# Patient Record
Sex: FEMALE | Race: WHITE | NOT HISPANIC OR LATINO | URBAN - METROPOLITAN AREA
[De-identification: names, ages, dates, MRNs, and addresses within clinical notes are randomized per-mention and may not be internally consistent; named-entity substitution may affect disease eponyms.]

---

## 2017-07-10 ENCOUNTER — CONVERSION ENCOUNTER (OUTPATIENT)
Dept: GENERAL RADIOLOGY | Facility: HOSPITAL | Age: 52
End: 2017-07-10

## 2018-01-10 ENCOUNTER — OFFICE VISIT CONVERTED (OUTPATIENT)
Dept: NEUROSURGERY | Facility: CLINIC | Age: 53
End: 2018-01-10
Attending: PHYSICIAN ASSISTANT

## 2018-02-08 ENCOUNTER — OFFICE VISIT CONVERTED (OUTPATIENT)
Dept: GASTROENTEROLOGY | Facility: CLINIC | Age: 53
End: 2018-02-08
Attending: NURSE PRACTITIONER

## 2018-03-19 ENCOUNTER — OFFICE VISIT CONVERTED (OUTPATIENT)
Dept: ORTHOPEDIC SURGERY | Facility: CLINIC | Age: 53
End: 2018-03-19
Attending: PHYSICIAN ASSISTANT

## 2018-04-03 ENCOUNTER — OFFICE VISIT CONVERTED (OUTPATIENT)
Dept: ORTHOPEDIC SURGERY | Facility: CLINIC | Age: 53
End: 2018-04-03
Attending: ORTHOPAEDIC SURGERY

## 2018-05-08 ENCOUNTER — OFFICE VISIT CONVERTED (OUTPATIENT)
Dept: GASTROENTEROLOGY | Facility: CLINIC | Age: 53
End: 2018-05-08
Attending: NURSE PRACTITIONER

## 2018-07-09 ENCOUNTER — OFFICE VISIT CONVERTED (OUTPATIENT)
Dept: GASTROENTEROLOGY | Facility: CLINIC | Age: 53
End: 2018-07-09
Attending: NURSE PRACTITIONER

## 2018-08-15 ENCOUNTER — OFFICE VISIT CONVERTED (OUTPATIENT)
Dept: GASTROENTEROLOGY | Facility: CLINIC | Age: 53
End: 2018-08-15
Attending: NURSE PRACTITIONER

## 2018-08-15 ENCOUNTER — CONVERSION ENCOUNTER (OUTPATIENT)
Dept: OTHER | Facility: HOSPITAL | Age: 53
End: 2018-08-15

## 2018-09-28 ENCOUNTER — OFFICE VISIT CONVERTED (OUTPATIENT)
Dept: ORTHOPEDIC SURGERY | Facility: CLINIC | Age: 53
End: 2018-09-28
Attending: PHYSICIAN ASSISTANT

## 2018-10-16 ENCOUNTER — OFFICE VISIT CONVERTED (OUTPATIENT)
Dept: ORTHOPEDIC SURGERY | Facility: CLINIC | Age: 53
End: 2018-10-16
Attending: PHYSICIAN ASSISTANT

## 2018-11-15 ENCOUNTER — OFFICE VISIT CONVERTED (OUTPATIENT)
Dept: GASTROENTEROLOGY | Facility: CLINIC | Age: 53
End: 2018-11-15
Attending: NURSE PRACTITIONER

## 2018-11-29 ENCOUNTER — CONVERSION ENCOUNTER (OUTPATIENT)
Dept: ORTHOPEDIC SURGERY | Facility: CLINIC | Age: 53
End: 2018-11-29

## 2018-11-29 ENCOUNTER — OFFICE VISIT CONVERTED (OUTPATIENT)
Dept: ORTHOPEDIC SURGERY | Facility: CLINIC | Age: 53
End: 2018-11-29
Attending: PHYSICIAN ASSISTANT

## 2019-01-02 ENCOUNTER — HOSPITAL ENCOUNTER (OUTPATIENT)
Dept: OTHER | Facility: HOSPITAL | Age: 54
Discharge: HOME | End: 2019-01-02
Attending: NURSE PRACTITIONER

## 2019-01-02 LAB
BASOPHILS # BLD AUTO: 0.08 10*3/UL (ref 0–0.2)
BASOPHILS NFR BLD AUTO: 1.1 % (ref 0–3)
EOSINOPHIL # BLD AUTO: 0.2 10*3/UL (ref 0–0.7)
EOSINOPHIL # BLD AUTO: 2.9 % (ref 0–7)
ERYTHROCYTE [DISTWIDTH] IN BLOOD BY AUTOMATED COUNT: 14.6 % (ref 11.5–14.5)
HBA1C MFR BLD: 13.7 G/DL (ref 12–16)
HCT VFR BLD AUTO: 41.7 % (ref 37–47)
LYMPHOCYTES # BLD AUTO: 2.09 10*3/UL (ref 1–5)
MCH RBC QN AUTO: 26.8 PG (ref 27–31)
MCHC RBC AUTO-ENTMCNC: 32.9 G/DL (ref 33–37)
MCV RBC AUTO: 81.5 FL (ref 81–99)
MONOCYTES # BLD AUTO: 0.46 10*3/UL (ref 0.2–1.2)
MONOCYTES NFR BLD AUTO: 6.66 % (ref 3–10)
NEUTROPHILS # BLD AUTO: 4.1 10*3/UL (ref 2–8)
NEUTROPHILS NFR BLD AUTO: 59.2 % (ref 30–85)
NRBC BLD AUTO-RTO: 0 % (ref 0–0.01)
PLATELET # BLD AUTO: 347 10*3/UL (ref 130–400)
PMV BLD AUTO: 8.4 FL (ref 7.4–10.4)
RBC # BLD AUTO: 5.12 10*6/UL (ref 4.2–5.4)
T4 FREE SERPL-MCNC: 1.3 NG/DL (ref 0.9–1.8)
TSH SERPL-ACNC: 1.05 M[IU]/L (ref 0.27–4.2)
VARIANT LYMPHS NFR BLD MANUAL: 30.1 % (ref 20–45)
WBC # BLD AUTO: 6.93 10*3/UL (ref 4.8–10.8)

## 2019-01-03 LAB — T3FREE SERPL-MCNC: 4.9 PG/ML (ref 2–4.4)

## 2019-01-22 ENCOUNTER — HOSPITAL ENCOUNTER (OUTPATIENT)
Dept: DIABETES SERVICES | Facility: HOSPITAL | Age: 54
Setting detail: RECURRING SERIES
Discharge: HOME | End: 2019-01-31
Attending: NURSE PRACTITIONER

## 2019-02-01 ENCOUNTER — HOSPITAL ENCOUNTER (OUTPATIENT)
Dept: PHYSICAL THERAPY | Facility: CLINIC | Age: 54
Setting detail: RECURRING SERIES
Discharge: HOME | End: 2019-02-13
Attending: ORTHOPAEDIC SURGERY

## 2019-03-04 ENCOUNTER — HOSPITAL ENCOUNTER (OUTPATIENT)
Dept: OTHER | Facility: HOSPITAL | Age: 54
Discharge: HOME | End: 2019-03-04

## 2019-03-04 LAB
ALBUMIN SERPL-MCNC: 4.5 G/DL (ref 3.5–5)
ALBUMIN/GLOB SERPL: 1.5 {RATIO} (ref 1.4–2.6)
ALP SERPL-CCNC: 81 U/L (ref 53–141)
ALT SERPL-CCNC: 9 U/L (ref 10–40)
ANION GAP SERPL CALC-SCNC: 17 MMOL/L (ref 8–19)
AST SERPL-CCNC: 20 U/L (ref 15–50)
BILIRUB SERPL-MCNC: 0.45 MG/DL (ref 0.2–1.3)
BUN SERPL-MCNC: 17 MG/DL (ref 5–25)
BUN/CREAT SERPL: 24 {RATIO} (ref 6–20)
CALCIUM SERPL-MCNC: 10.6 MG/DL (ref 8.7–10.4)
CHLORIDE SERPL-SCNC: 100 MMOL/L (ref 99–111)
CONV CO2: 29 MMOL/L (ref 22–32)
CONV TOTAL PROTEIN: 7.5 G/DL (ref 6.3–8.2)
CREAT UR-MCNC: 0.7 MG/DL (ref 0.5–0.9)
GFR SERPLBLD BASED ON 1.73 SQ M-ARVRAT: >60 ML/MIN/{1.73_M2}
GLOBULIN UR ELPH-MCNC: 3 G/DL (ref 2–3.5)
GLUCOSE SERPL-MCNC: 90 MG/DL (ref 65–99)
MAGNESIUM SERPL-MCNC: 1.46 MG/DL (ref 1.6–2.3)
OSMOLALITY SERPL CALC.SUM OF ELEC: 297 MOSM/KG (ref 273–304)
POTASSIUM SERPL-SCNC: 3.4 MMOL/L (ref 3.5–5.3)
SODIUM SERPL-SCNC: 143 MMOL/L (ref 135–147)

## 2019-03-12 ENCOUNTER — HOSPITAL ENCOUNTER (OUTPATIENT)
Dept: OTHER | Facility: HOSPITAL | Age: 54
Discharge: HOME | End: 2019-03-12

## 2019-03-12 LAB
APPEARANCE UR: CLEAR
BILIRUB UR QL: NEGATIVE
COLOR UR: YELLOW
CONV BACTERIA: NEGATIVE
CONV COLLECTION SOURCE (UA): ABNORMAL
CONV CREATININE URINE, RANDOM: 150.6 MG/DL (ref 10–300)
CONV MICROALBUM.,U,RANDOM: 13.1 MG/L (ref 0–20)
CONV PROTEIN TO CREATININE RATIO (RANDOM URINE): 0.13 {RATIO} (ref 0–0.1)
CONV UROBILINOGEN IN URINE BY AUTOMATED TEST STRIP: 0.2 {EHRLICHU}/DL (ref 0.1–1)
GLUCOSE UR QL: NEGATIVE MG/DL
HGB UR QL STRIP: NEGATIVE
KETONES UR QL STRIP: NEGATIVE MG/DL
LEUKOCYTE ESTERASE UR QL STRIP: ABNORMAL
MICROALBUMIN/CREAT UR: 8.7 MG/G{CRE} (ref 0–35)
NITRITE UR QL STRIP: NEGATIVE
PH UR STRIP.AUTO: 7.5 [PH] (ref 5–8)
PROT UR QL: NEGATIVE MG/DL
PROT UR-MCNC: 18.9 MG/DL
RBC #/AREA URNS HPF: ABNORMAL /[HPF]
SP GR UR: 1.02 (ref 1–1.03)
WBC #/AREA URNS HPF: ABNORMAL /[HPF]

## 2019-04-22 ENCOUNTER — HOSPITAL ENCOUNTER (OUTPATIENT)
Dept: DIABETES SERVICES | Facility: HOSPITAL | Age: 54
Setting detail: RECURRING SERIES
Discharge: HOME | End: 2019-04-30
Attending: NURSE PRACTITIONER

## 2019-05-09 ENCOUNTER — HOSPITAL ENCOUNTER (OUTPATIENT)
Dept: OTHER | Facility: HOSPITAL | Age: 54
Discharge: HOME | End: 2019-05-09
Attending: NURSE PRACTITIONER

## 2019-05-09 LAB
ALBUMIN SERPL-MCNC: 4.2 G/DL (ref 3.5–5)
ALBUMIN/GLOB SERPL: 1.4 {RATIO} (ref 1.4–2.6)
ALP SERPL-CCNC: 124 U/L (ref 53–141)
ALT SERPL-CCNC: 7 U/L (ref 10–40)
ANION GAP SERPL CALC-SCNC: 15 MMOL/L (ref 8–19)
AST SERPL-CCNC: 20 U/L (ref 15–50)
BILIRUB SERPL-MCNC: 0.26 MG/DL (ref 0.2–1.3)
BUN SERPL-MCNC: 20 MG/DL (ref 5–25)
BUN/CREAT SERPL: 31 {RATIO} (ref 6–20)
CALCIUM SERPL-MCNC: 9.6 MG/DL (ref 8.7–10.4)
CHLORIDE SERPL-SCNC: 98 MMOL/L (ref 99–111)
CHOLEST SERPL-MCNC: 204 MG/DL (ref 107–200)
CHOLEST/HDLC SERPL: 3.1 {RATIO} (ref 3–6)
CONV CO2: 31 MMOL/L (ref 22–32)
CONV TOTAL PROTEIN: 7.3 G/DL (ref 6.3–8.2)
CREAT UR-MCNC: 0.64 MG/DL (ref 0.5–0.9)
GFR SERPLBLD BASED ON 1.73 SQ M-ARVRAT: >60 ML/MIN/{1.73_M2}
GLOBULIN UR ELPH-MCNC: 3.1 G/DL (ref 2–3.5)
GLUCOSE SERPL-MCNC: 88 MG/DL (ref 65–99)
HDLC SERPL-MCNC: 66 MG/DL (ref 40–60)
LDLC SERPL CALC-MCNC: 104 MG/DL (ref 70–100)
OSMOLALITY SERPL CALC.SUM OF ELEC: 292 MOSM/KG (ref 273–304)
POTASSIUM SERPL-SCNC: 3.6 MMOL/L (ref 3.5–5.3)
SODIUM SERPL-SCNC: 140 MMOL/L (ref 135–147)
TRIGL SERPL-MCNC: 169 MG/DL (ref 40–150)
VLDLC SERPL-MCNC: 34 MG/DL (ref 5–37)

## 2019-05-15 ENCOUNTER — HOSPITAL ENCOUNTER (OUTPATIENT)
Dept: GENERAL RADIOLOGY | Facility: HOSPITAL | Age: 54
Discharge: HOME | End: 2019-05-15
Attending: NURSE PRACTITIONER

## 2019-05-15 ENCOUNTER — OFFICE VISIT CONVERTED (OUTPATIENT)
Dept: GASTROENTEROLOGY | Facility: CLINIC | Age: 54
End: 2019-05-15
Attending: NURSE PRACTITIONER

## 2019-06-11 ENCOUNTER — HOSPITAL ENCOUNTER (OUTPATIENT)
Dept: OTHER | Facility: HOSPITAL | Age: 54
Discharge: HOME | End: 2019-06-11

## 2019-06-11 LAB
ALBUMIN SERPL-MCNC: 4.1 G/DL (ref 3.5–5)
ALBUMIN/GLOB SERPL: 1.6 {RATIO} (ref 1.4–2.6)
ALP SERPL-CCNC: 100 U/L (ref 53–141)
ALT SERPL-CCNC: 9 U/L (ref 10–40)
ANION GAP SERPL CALC-SCNC: 17 MMOL/L (ref 8–19)
AST SERPL-CCNC: 21 U/L (ref 15–50)
BASOPHILS # BLD AUTO: 0.07 10*3/UL (ref 0–0.2)
BASOPHILS NFR BLD AUTO: 1.2 % (ref 0–3)
BILIRUB SERPL-MCNC: 0.4 MG/DL (ref 0.2–1.3)
BUN SERPL-MCNC: 16 MG/DL (ref 5–25)
BUN/CREAT SERPL: 25 {RATIO} (ref 6–20)
CALCIUM SERPL-MCNC: 9.4 MG/DL (ref 8.7–10.4)
CHLORIDE SERPL-SCNC: 102 MMOL/L (ref 99–111)
CHOLEST SERPL-MCNC: 225 MG/DL (ref 107–200)
CHOLEST/HDLC SERPL: 2.7 {RATIO} (ref 3–6)
CONV ABS IMM GRAN: 0.01 10*3/UL (ref 0–0.2)
CONV CO2: 29 MMOL/L (ref 22–32)
CONV IMMATURE GRAN: 0.2 % (ref 0–1.8)
CONV TOTAL PROTEIN: 6.7 G/DL (ref 6.3–8.2)
CREAT UR-MCNC: 0.63 MG/DL (ref 0.5–0.9)
DEPRECATED RDW RBC AUTO: 38.3 FL (ref 36.4–46.3)
EOSINOPHIL # BLD AUTO: 0.25 10*3/UL (ref 0–0.7)
EOSINOPHIL # BLD AUTO: 4.3 % (ref 0–7)
ERYTHROCYTE [DISTWIDTH] IN BLOOD BY AUTOMATED COUNT: 12.7 % (ref 11.7–14.4)
EST. AVERAGE GLUCOSE BLD GHB EST-MCNC: 103 MG/DL
GFR SERPLBLD BASED ON 1.73 SQ M-ARVRAT: >60 ML/MIN/{1.73_M2}
GLOBULIN UR ELPH-MCNC: 2.6 G/DL (ref 2–3.5)
GLUCOSE SERPL-MCNC: 87 MG/DL (ref 65–99)
HBA1C MFR BLD: 12.6 G/DL (ref 12–16)
HBA1C MFR BLD: 5.2 % (ref 3.5–5.7)
HCT VFR BLD AUTO: 38.5 % (ref 37–47)
HDLC SERPL-MCNC: 84 MG/DL (ref 40–60)
LDLC SERPL CALC-MCNC: 128 MG/DL (ref 70–100)
LYMPHOCYTES # BLD AUTO: 2.03 10*3/UL (ref 1–5)
MAGNESIUM SERPL-MCNC: 1.48 MG/DL (ref 1.6–2.3)
MCH RBC QN AUTO: 27.7 PG (ref 27–31)
MCHC RBC AUTO-ENTMCNC: 32.7 G/DL (ref 33–37)
MCV RBC AUTO: 84.6 FL (ref 81–99)
MONOCYTES # BLD AUTO: 0.54 10*3/UL (ref 0.2–1.2)
MONOCYTES NFR BLD AUTO: 9.2 % (ref 3–10)
NEUTROPHILS # BLD AUTO: 2.94 10*3/UL (ref 2–8)
NEUTROPHILS NFR BLD AUTO: 50.3 % (ref 30–85)
NRBC CBCN: 0 % (ref 0–0.7)
OSMOLALITY SERPL CALC.SUM OF ELEC: 301 MOSM/KG (ref 273–304)
PLATELET # BLD AUTO: 303 10*3/UL (ref 130–400)
PMV BLD AUTO: 11.3 FL (ref 9.4–12.3)
POTASSIUM SERPL-SCNC: 3.4 MMOL/L (ref 3.5–5.3)
RBC # BLD AUTO: 4.55 10*6/UL (ref 4.2–5.4)
SODIUM SERPL-SCNC: 145 MMOL/L (ref 135–147)
TRIGL SERPL-MCNC: 63 MG/DL (ref 40–150)
VARIANT LYMPHS NFR BLD MANUAL: 34.8 % (ref 20–45)
VLDLC SERPL-MCNC: 13 MG/DL (ref 5–37)
WBC # BLD AUTO: 5.84 10*3/UL (ref 4.8–10.8)

## 2019-07-01 ENCOUNTER — HOSPITAL ENCOUNTER (OUTPATIENT)
Dept: GENERAL RADIOLOGY | Facility: HOSPITAL | Age: 54
Discharge: HOME | End: 2019-07-01
Attending: NURSE PRACTITIONER

## 2019-07-03 ENCOUNTER — HOSPITAL ENCOUNTER (OUTPATIENT)
Dept: NUTRITION | Facility: HOSPITAL | Age: 54
Setting detail: RECURRING SERIES
Discharge: HOME | End: 2019-07-31
Attending: NURSE PRACTITIONER

## 2019-08-13 ENCOUNTER — HOSPITAL ENCOUNTER (OUTPATIENT)
Dept: GENERAL RADIOLOGY | Facility: HOSPITAL | Age: 54
Discharge: HOME | End: 2019-08-13
Attending: NURSE PRACTITIONER

## 2019-08-27 ENCOUNTER — HOSPITAL ENCOUNTER (OUTPATIENT)
Dept: OTHER | Facility: HOSPITAL | Age: 54
Discharge: HOME | End: 2019-08-27
Attending: INTERNAL MEDICINE

## 2019-08-27 LAB
ALBUMIN SERPL-MCNC: 4.4 G/DL (ref 3.5–5)
ALBUMIN/GLOB SERPL: 1.7 {RATIO} (ref 1.4–2.6)
ALP SERPL-CCNC: 96 U/L (ref 53–141)
ALT SERPL-CCNC: 10 U/L (ref 10–40)
ANION GAP SERPL CALC-SCNC: 15 MMOL/L (ref 8–19)
AST SERPL-CCNC: 19 U/L (ref 15–50)
BILIRUB SERPL-MCNC: 0.37 MG/DL (ref 0.2–1.3)
BUN SERPL-MCNC: 13 MG/DL (ref 5–25)
BUN/CREAT SERPL: 18 {RATIO} (ref 6–20)
CALCIUM SERPL-MCNC: 9.4 MG/DL (ref 8.7–10.4)
CHLORIDE SERPL-SCNC: 99 MMOL/L (ref 99–111)
CONV CO2: 29 MMOL/L (ref 22–32)
CONV TOTAL PROTEIN: 7 G/DL (ref 6.3–8.2)
CREAT UR-MCNC: 0.72 MG/DL (ref 0.5–0.9)
GFR SERPLBLD BASED ON 1.73 SQ M-ARVRAT: >60 ML/MIN/{1.73_M2}
GLOBULIN UR ELPH-MCNC: 2.6 G/DL (ref 2–3.5)
GLUCOSE SERPL-MCNC: 87 MG/DL (ref 65–99)
MAGNESIUM SERPL-MCNC: 1.49 MG/DL (ref 1.6–2.3)
OSMOLALITY SERPL CALC.SUM OF ELEC: 287 MOSM/KG (ref 273–304)
POTASSIUM SERPL-SCNC: 3.8 MMOL/L (ref 3.5–5.3)
SODIUM SERPL-SCNC: 139 MMOL/L (ref 135–147)

## 2019-09-11 ENCOUNTER — HOSPITAL ENCOUNTER (OUTPATIENT)
Dept: NUTRITION | Facility: HOSPITAL | Age: 54
Setting detail: RECURRING SERIES
Discharge: HOME | End: 2019-09-30
Attending: NURSE PRACTITIONER

## 2019-11-06 ENCOUNTER — OFFICE VISIT CONVERTED (OUTPATIENT)
Dept: GASTROENTEROLOGY | Facility: CLINIC | Age: 54
End: 2019-11-06
Attending: NURSE PRACTITIONER

## 2019-11-06 ENCOUNTER — HOSPITAL ENCOUNTER (OUTPATIENT)
Dept: LAB | Facility: HOSPITAL | Age: 54
Discharge: HOME | End: 2019-11-06
Attending: INTERNAL MEDICINE

## 2019-11-06 LAB
ALBUMIN SERPL-MCNC: 4.8 G/DL (ref 3.5–5)
ALBUMIN/GLOB SERPL: 2.1 {RATIO} (ref 1.4–2.6)
ALP SERPL-CCNC: 82 U/L (ref 53–141)
ALT SERPL-CCNC: 6 U/L (ref 10–40)
ANION GAP SERPL CALC-SCNC: 20 MMOL/L (ref 8–19)
AST SERPL-CCNC: 19 U/L (ref 15–50)
BILIRUB SERPL-MCNC: 0.53 MG/DL (ref 0.2–1.3)
BUN SERPL-MCNC: 11 MG/DL (ref 5–25)
BUN/CREAT SERPL: 14 {RATIO} (ref 6–20)
CALCIUM SERPL-MCNC: 10.2 MG/DL (ref 8.7–10.4)
CHLORIDE SERPL-SCNC: 99 MMOL/L (ref 99–111)
CONV CO2: 27 MMOL/L (ref 22–32)
CONV TOTAL PROTEIN: 7.1 G/DL (ref 6.3–8.2)
CREAT UR-MCNC: 0.81 MG/DL (ref 0.5–0.9)
GFR SERPLBLD BASED ON 1.73 SQ M-ARVRAT: >60 ML/MIN/{1.73_M2}
GLOBULIN UR ELPH-MCNC: 2.3 G/DL (ref 2–3.5)
GLUCOSE SERPL-MCNC: 102 MG/DL (ref 65–99)
MAGNESIUM SERPL-MCNC: 1.32 MG/DL (ref 1.6–2.3)
OSMOLALITY SERPL CALC.SUM OF ELEC: 296 MOSM/KG (ref 273–304)
POTASSIUM SERPL-SCNC: 3.3 MMOL/L (ref 3.5–5.3)
SODIUM SERPL-SCNC: 143 MMOL/L (ref 135–147)

## 2019-12-09 ENCOUNTER — HOSPITAL ENCOUNTER (OUTPATIENT)
Dept: OTHER | Facility: HOSPITAL | Age: 54
Discharge: HOME | End: 2019-12-09
Attending: INTERNAL MEDICINE

## 2019-12-09 LAB
ALBUMIN SERPL-MCNC: 4.8 G/DL (ref 3.5–5)
ALBUMIN/GLOB SERPL: 1.9 {RATIO} (ref 1.4–2.6)
ALP SERPL-CCNC: 80 U/L (ref 53–141)
ALT SERPL-CCNC: 9 U/L (ref 10–40)
ANION GAP SERPL CALC-SCNC: 21 MMOL/L (ref 8–19)
APPEARANCE UR: CLEAR
AST SERPL-CCNC: 22 U/L (ref 15–50)
BILIRUB SERPL-MCNC: 0.79 MG/DL (ref 0.2–1.3)
BILIRUB UR QL: NEGATIVE
BUN SERPL-MCNC: 21 MG/DL (ref 5–25)
BUN/CREAT SERPL: 30 {RATIO} (ref 6–20)
CALCIUM SERPL-MCNC: 10.3 MG/DL (ref 8.7–10.4)
CHLORIDE SERPL-SCNC: 99 MMOL/L (ref 99–111)
COLOR UR: YELLOW
CONV BACTERIA: NEGATIVE
CONV CO2: 24 MMOL/L (ref 22–32)
CONV COLLECTION SOURCE (UA): ABNORMAL
CONV CREATININE URINE, RANDOM: 178.1 MG/DL (ref 10–300)
CONV HYALINE CASTS IN URINE MICRO: ABNORMAL /[LPF]
CONV MICROALBUM.,U,RANDOM: 23.1 MG/L (ref 0–20)
CONV PROTEIN TO CREATININE RATIO (RANDOM URINE): 0.09 {RATIO} (ref 0–0.1)
CONV TOTAL PROTEIN: 7.3 G/DL (ref 6.3–8.2)
CONV UROBILINOGEN IN URINE BY AUTOMATED TEST STRIP: 0.2 {EHRLICHU}/DL (ref 0.1–1)
CREAT UR-MCNC: 0.7 MG/DL (ref 0.5–0.9)
GFR SERPLBLD BASED ON 1.73 SQ M-ARVRAT: >60 ML/MIN/{1.73_M2}
GLOBULIN UR ELPH-MCNC: 2.5 G/DL (ref 2–3.5)
GLUCOSE SERPL-MCNC: 92 MG/DL (ref 65–99)
GLUCOSE UR QL: NEGATIVE MG/DL
HGB UR QL STRIP: NEGATIVE
KETONES UR QL STRIP: 40 MG/DL
LEUKOCYTE ESTERASE UR QL STRIP: ABNORMAL
MICROALBUMIN/CREAT UR: 13 MG/G{CRE} (ref 0–35)
NITRITE UR QL STRIP: NEGATIVE
OSMOLALITY SERPL CALC.SUM OF ELEC: 293 MOSM/KG (ref 273–304)
PH UR STRIP.AUTO: 5.5 [PH] (ref 5–8)
POTASSIUM SERPL-SCNC: 3.5 MMOL/L (ref 3.5–5.3)
PROT UR QL: NEGATIVE MG/DL
PROT UR-MCNC: 16.3 MG/DL
RBC #/AREA URNS HPF: ABNORMAL /[HPF]
SODIUM SERPL-SCNC: 140 MMOL/L (ref 135–147)
SP GR UR: 1.02 (ref 1–1.03)
SQUAMOUS SPT QL MICRO: ABNORMAL /[HPF]
WBC #/AREA URNS HPF: ABNORMAL /[HPF]

## 2019-12-17 ENCOUNTER — HOSPITAL ENCOUNTER (OUTPATIENT)
Dept: GASTROENTEROLOGY | Facility: HOSPITAL | Age: 54
Setting detail: HOSPITAL OUTPATIENT SURGERY
Discharge: HOME | End: 2019-12-17
Attending: INTERNAL MEDICINE

## 2020-04-30 ENCOUNTER — TELEPHONE CONVERTED (OUTPATIENT)
Dept: GASTROENTEROLOGY | Facility: CLINIC | Age: 55
End: 2020-04-30
Attending: NURSE PRACTITIONER

## 2020-05-12 ENCOUNTER — HOSPITAL ENCOUNTER (OUTPATIENT)
Dept: OTHER | Facility: HOSPITAL | Age: 55
Discharge: HOME | End: 2020-05-12
Attending: INTERNAL MEDICINE

## 2020-05-12 LAB
ALBUMIN SERPL-MCNC: 4.3 G/DL (ref 3.5–5)
ALBUMIN/GLOB SERPL: 1.6 {RATIO} (ref 1.4–2.6)
ALP SERPL-CCNC: 83 U/L (ref 53–141)
ALT SERPL-CCNC: 7 U/L (ref 10–40)
ANION GAP SERPL CALC-SCNC: 15 MMOL/L (ref 8–19)
AST SERPL-CCNC: 23 U/L (ref 15–50)
BILIRUB SERPL-MCNC: 0.58 MG/DL (ref 0.2–1.3)
BUN SERPL-MCNC: 15 MG/DL (ref 5–25)
BUN/CREAT SERPL: 22 {RATIO} (ref 6–20)
CALCIUM SERPL-MCNC: 9.8 MG/DL (ref 8.7–10.4)
CHLORIDE SERPL-SCNC: 100 MMOL/L (ref 99–111)
CONV CO2: 29 MMOL/L (ref 22–32)
CONV TOTAL PROTEIN: 7 G/DL (ref 6.3–8.2)
CREAT UR-MCNC: 0.69 MG/DL (ref 0.5–0.9)
GFR SERPLBLD BASED ON 1.73 SQ M-ARVRAT: >60 ML/MIN/{1.73_M2}
GLOBULIN UR ELPH-MCNC: 2.7 G/DL (ref 2–3.5)
GLUCOSE SERPL-MCNC: 79 MG/DL (ref 65–99)
MAGNESIUM SERPL-MCNC: 1.6 MG/DL (ref 1.6–2.3)
OSMOLALITY SERPL CALC.SUM OF ELEC: 290 MOSM/KG (ref 273–304)
POTASSIUM SERPL-SCNC: 4.3 MMOL/L (ref 3.5–5.3)
SODIUM SERPL-SCNC: 140 MMOL/L (ref 135–147)

## 2020-06-01 ENCOUNTER — OFFICE VISIT CONVERTED (OUTPATIENT)
Dept: GASTROENTEROLOGY | Facility: CLINIC | Age: 55
End: 2020-06-01
Attending: NURSE PRACTITIONER

## 2020-06-05 ENCOUNTER — HOSPITAL ENCOUNTER (OUTPATIENT)
Dept: NUCLEAR MEDICINE | Facility: HOSPITAL | Age: 55
Discharge: HOME | End: 2020-06-05
Attending: NURSE PRACTITIONER

## 2020-06-05 LAB
ALBUMIN SERPL-MCNC: 4.2 G/DL (ref 3.5–5)
ALBUMIN/GLOB SERPL: 1.8 {RATIO} (ref 1.4–2.6)
ALP SERPL-CCNC: 76 U/L (ref 53–141)
ALT SERPL-CCNC: 7 U/L (ref 10–40)
ANION GAP SERPL CALC-SCNC: 12 MMOL/L (ref 8–19)
AST SERPL-CCNC: 18 U/L (ref 15–50)
BASOPHILS # BLD AUTO: 0.06 10*3/UL (ref 0–0.2)
BASOPHILS NFR BLD AUTO: 1.1 % (ref 0–3)
BILIRUB SERPL-MCNC: 0.4 MG/DL (ref 0.2–1.3)
BUN SERPL-MCNC: 25 MG/DL (ref 5–25)
BUN/CREAT SERPL: 45 {RATIO} (ref 6–20)
CALCIUM SERPL-MCNC: 9.3 MG/DL (ref 8.7–10.4)
CHLORIDE SERPL-SCNC: 101 MMOL/L (ref 99–111)
CHOLEST SERPL-MCNC: 179 MG/DL (ref 107–200)
CHOLEST/HDLC SERPL: 2.5 {RATIO} (ref 3–6)
CONV ABS IMM GRAN: 0.01 10*3/UL (ref 0–0.2)
CONV CO2: 29 MMOL/L (ref 22–32)
CONV IMMATURE GRAN: 0.2 % (ref 0–1.8)
CONV TOTAL PROTEIN: 6.6 G/DL (ref 6.3–8.2)
CREAT UR-MCNC: 0.56 MG/DL (ref 0.5–0.9)
DEPRECATED RDW RBC AUTO: 41.4 FL (ref 36.4–46.3)
EOSINOPHIL # BLD AUTO: 0.21 10*3/UL (ref 0–0.7)
EOSINOPHIL # BLD AUTO: 3.9 % (ref 0–7)
ERYTHROCYTE [DISTWIDTH] IN BLOOD BY AUTOMATED COUNT: 13.3 % (ref 11.7–14.4)
EST. AVERAGE GLUCOSE BLD GHB EST-MCNC: 111 MG/DL
GFR SERPLBLD BASED ON 1.73 SQ M-ARVRAT: >60 ML/MIN/{1.73_M2}
GLOBULIN UR ELPH-MCNC: 2.4 G/DL (ref 2–3.5)
GLUCOSE SERPL-MCNC: 90 MG/DL (ref 65–99)
HBA1C MFR BLD: 5.5 % (ref 3.5–5.7)
HCT VFR BLD AUTO: 39.5 % (ref 37–47)
HDLC SERPL-MCNC: 71 MG/DL (ref 40–60)
HGB BLD-MCNC: 12.4 G/DL (ref 12–16)
LDLC SERPL CALC-MCNC: 91 MG/DL (ref 70–100)
LYMPHOCYTES # BLD AUTO: 1.92 10*3/UL (ref 1–5)
LYMPHOCYTES NFR BLD AUTO: 35.8 % (ref 20–45)
MCH RBC QN AUTO: 26.6 PG (ref 27–31)
MCHC RBC AUTO-ENTMCNC: 31.4 G/DL (ref 33–37)
MCV RBC AUTO: 84.8 FL (ref 81–99)
MONOCYTES # BLD AUTO: 0.46 10*3/UL (ref 0.2–1.2)
MONOCYTES NFR BLD AUTO: 8.6 % (ref 3–10)
NEUTROPHILS # BLD AUTO: 2.7 10*3/UL (ref 2–8)
NEUTROPHILS NFR BLD AUTO: 50.4 % (ref 30–85)
NRBC CBCN: 0 % (ref 0–0.7)
OSMOLALITY SERPL CALC.SUM OF ELEC: 292 MOSM/KG (ref 273–304)
PLATELET # BLD AUTO: 202 10*3/UL (ref 130–400)
PMV BLD AUTO: 12.9 FL (ref 9.4–12.3)
POTASSIUM SERPL-SCNC: 3.4 MMOL/L (ref 3.5–5.3)
RBC # BLD AUTO: 4.66 10*6/UL (ref 4.2–5.4)
SODIUM SERPL-SCNC: 139 MMOL/L (ref 135–147)
TRIGL SERPL-MCNC: 84 MG/DL (ref 40–150)
VLDLC SERPL-MCNC: 17 MG/DL (ref 5–37)
WBC # BLD AUTO: 5.36 10*3/UL (ref 4.8–10.8)

## 2020-06-29 ENCOUNTER — OFFICE VISIT CONVERTED (OUTPATIENT)
Dept: NEUROLOGY | Facility: CLINIC | Age: 55
End: 2020-06-29
Attending: PSYCHIATRY & NEUROLOGY

## 2020-06-29 ENCOUNTER — CONVERSION ENCOUNTER (OUTPATIENT)
Dept: NEUROLOGY | Facility: CLINIC | Age: 55
End: 2020-06-29

## 2020-07-14 ENCOUNTER — OFFICE VISIT CONVERTED (OUTPATIENT)
Dept: GASTROENTEROLOGY | Facility: CLINIC | Age: 55
End: 2020-07-14
Attending: NURSE PRACTITIONER

## 2020-07-16 ENCOUNTER — HOSPITAL ENCOUNTER (OUTPATIENT)
Dept: GENERAL RADIOLOGY | Facility: HOSPITAL | Age: 55
Discharge: HOME | End: 2020-07-16
Attending: NURSE PRACTITIONER

## 2020-08-05 ENCOUNTER — HOSPITAL ENCOUNTER (OUTPATIENT)
Dept: OTHER | Facility: HOSPITAL | Age: 55
Discharge: HOME | End: 2020-08-05
Attending: INTERNAL MEDICINE

## 2020-08-05 LAB
ALBUMIN SERPL-MCNC: 4.3 G/DL (ref 3.5–5)
ALBUMIN/GLOB SERPL: 1.3 {RATIO} (ref 1.4–2.6)
ALP SERPL-CCNC: 80 U/L (ref 53–141)
ALT SERPL-CCNC: <5 U/L (ref 10–40)
ANION GAP SERPL CALC-SCNC: 25 MMOL/L (ref 8–19)
AST SERPL-CCNC: 18 U/L (ref 15–50)
BILIRUB SERPL-MCNC: 0.51 MG/DL (ref 0.2–1.3)
BUN SERPL-MCNC: 12 MG/DL (ref 5–25)
BUN/CREAT SERPL: 14 {RATIO} (ref 6–20)
CALCIUM SERPL-MCNC: 10.6 MG/DL (ref 8.7–10.4)
CHLORIDE SERPL-SCNC: 101 MMOL/L (ref 99–111)
CONV CO2: 23 MMOL/L (ref 22–32)
CONV TOTAL PROTEIN: 7.6 G/DL (ref 6.3–8.2)
CREAT UR-MCNC: 0.86 MG/DL (ref 0.5–0.9)
GFR SERPLBLD BASED ON 1.73 SQ M-ARVRAT: >60 ML/MIN/{1.73_M2}
GLOBULIN UR ELPH-MCNC: 3.3 G/DL (ref 2–3.5)
GLUCOSE SERPL-MCNC: 90 MG/DL (ref 65–99)
MAGNESIUM SERPL-MCNC: 1.7 MG/DL (ref 1.6–2.3)
OSMOLALITY SERPL CALC.SUM OF ELEC: 299 MOSM/KG (ref 273–304)
PHOSPHATE SERPL-MCNC: 3.1 MG/DL (ref 2.4–4.5)
POTASSIUM SERPL-SCNC: 3.5 MMOL/L (ref 3.5–5.3)
SODIUM SERPL-SCNC: 145 MMOL/L (ref 135–147)

## 2020-08-26 ENCOUNTER — OFFICE VISIT CONVERTED (OUTPATIENT)
Dept: GASTROENTEROLOGY | Facility: CLINIC | Age: 55
End: 2020-08-26
Attending: NURSE PRACTITIONER

## 2020-08-31 ENCOUNTER — HOSPITAL ENCOUNTER (OUTPATIENT)
Dept: CT IMAGING | Facility: HOSPITAL | Age: 55
Discharge: HOME | End: 2020-08-31
Attending: NURSE PRACTITIONER

## 2020-10-14 ENCOUNTER — HOSPITAL ENCOUNTER (OUTPATIENT)
Dept: OTHER | Facility: HOSPITAL | Age: 55
Discharge: HOME | End: 2020-10-14
Attending: INTERNAL MEDICINE

## 2020-10-14 LAB
ALBUMIN SERPL-MCNC: 4.5 G/DL (ref 3.5–5)
ALBUMIN/GLOB SERPL: 1.7 {RATIO} (ref 1.4–2.6)
ALP SERPL-CCNC: 62 U/L (ref 53–141)
ALT SERPL-CCNC: 10 U/L (ref 10–40)
ANION GAP SERPL CALC-SCNC: 19 MMOL/L (ref 8–19)
APPEARANCE UR: CLEAR
AST SERPL-CCNC: 20 U/L (ref 15–50)
BILIRUB SERPL-MCNC: 0.26 MG/DL (ref 0.2–1.3)
BILIRUB UR QL: NEGATIVE
BUN SERPL-MCNC: 12 MG/DL (ref 5–25)
BUN/CREAT SERPL: 14 {RATIO} (ref 6–20)
CALCIUM SERPL-MCNC: 10.7 MG/DL (ref 8.7–10.4)
CHLORIDE SERPL-SCNC: 101 MMOL/L (ref 99–111)
COLOR UR: YELLOW
CONV BACTERIA: NEGATIVE
CONV CO2: 26 MMOL/L (ref 22–32)
CONV COLLECTION SOURCE (UA): ABNORMAL
CONV CREATININE URINE, RANDOM: 30.4 MG/DL (ref 10–300)
CONV MICROALBUM.,U,RANDOM: <12 MG/L (ref 0–20)
CONV PROTEIN TO CREATININE RATIO (RANDOM URINE): 0.13 {RATIO} (ref 0–0.1)
CONV TOTAL PROTEIN: 7.2 G/DL (ref 6.3–8.2)
CONV UROBILINOGEN IN URINE BY AUTOMATED TEST STRIP: 0.2 {EHRLICHU}/DL (ref 0.1–1)
CREAT UR-MCNC: 0.83 MG/DL (ref 0.5–0.9)
GFR SERPLBLD BASED ON 1.73 SQ M-ARVRAT: >60 ML/MIN/{1.73_M2}
GLOBULIN UR ELPH-MCNC: 2.7 G/DL (ref 2–3.5)
GLUCOSE SERPL-MCNC: 84 MG/DL (ref 65–99)
GLUCOSE UR QL: NEGATIVE MG/DL
HGB UR QL STRIP: NEGATIVE
KETONES UR QL STRIP: NEGATIVE MG/DL
LEUKOCYTE ESTERASE UR QL STRIP: ABNORMAL
MAGNESIUM SERPL-MCNC: 1.28 MG/DL (ref 1.6–2.3)
MICROALBUMIN/CREAT UR: 39.5 MG/G{CRE} (ref 0–35)
NITRITE UR QL STRIP: NEGATIVE
OSMOLALITY SERPL CALC.SUM OF ELEC: 293 MOSM/KG (ref 273–304)
PH UR STRIP.AUTO: 6 [PH] (ref 5–8)
POTASSIUM SERPL-SCNC: 3.7 MMOL/L (ref 3.5–5.3)
PROT UR QL: NEGATIVE MG/DL
PROT UR-MCNC: <4 MG/DL
RBC #/AREA URNS HPF: ABNORMAL /[HPF]
SODIUM SERPL-SCNC: 142 MMOL/L (ref 135–147)
SP GR UR: 1.01 (ref 1–1.03)
WBC #/AREA URNS HPF: ABNORMAL /[HPF]

## 2020-10-31 ENCOUNTER — HOSPITAL ENCOUNTER (OUTPATIENT)
Dept: PREADMISSION TESTING | Facility: HOSPITAL | Age: 55
Discharge: HOME | End: 2020-10-31
Attending: INTERNAL MEDICINE

## 2020-11-01 LAB — SARS-COV-2 RNA SPEC QL NAA+PROBE: NOT DETECTED

## 2020-11-05 ENCOUNTER — HOSPITAL ENCOUNTER (OUTPATIENT)
Dept: GASTROENTEROLOGY | Facility: HOSPITAL | Age: 55
Setting detail: HOSPITAL OUTPATIENT SURGERY
Discharge: HOME | End: 2020-11-05
Attending: INTERNAL MEDICINE

## 2020-11-17 ENCOUNTER — HOSPITAL ENCOUNTER (OUTPATIENT)
Dept: OTHER | Facility: HOSPITAL | Age: 55
Discharge: HOME | End: 2020-11-17
Attending: INTERNAL MEDICINE

## 2020-11-17 LAB
ALBUMIN SERPL-MCNC: 4 G/DL (ref 3.5–5)
ALBUMIN/GLOB SERPL: 1.5 {RATIO} (ref 1.4–2.6)
ALP SERPL-CCNC: 58 U/L (ref 53–141)
ALT SERPL-CCNC: 7 U/L (ref 10–40)
ANION GAP SERPL CALC-SCNC: 15 MMOL/L (ref 8–19)
AST SERPL-CCNC: 17 U/L (ref 15–50)
BILIRUB SERPL-MCNC: 0.34 MG/DL (ref 0.2–1.3)
BUN SERPL-MCNC: 24 MG/DL (ref 5–25)
BUN/CREAT SERPL: 31 {RATIO} (ref 6–20)
CALCIUM SERPL-MCNC: 9.7 MG/DL (ref 8.7–10.4)
CHLORIDE SERPL-SCNC: 101 MMOL/L (ref 99–111)
CONV CO2: 29 MMOL/L (ref 22–32)
CONV TOTAL PROTEIN: 6.6 G/DL (ref 6.3–8.2)
CREAT UR-MCNC: 0.78 MG/DL (ref 0.5–0.9)
GFR SERPLBLD BASED ON 1.73 SQ M-ARVRAT: >60 ML/MIN/{1.73_M2}
GLOBULIN UR ELPH-MCNC: 2.6 G/DL (ref 2–3.5)
GLUCOSE SERPL-MCNC: 89 MG/DL (ref 65–99)
MAGNESIUM SERPL-MCNC: 1.62 MG/DL (ref 1.6–2.3)
OSMOLALITY SERPL CALC.SUM OF ELEC: 296 MOSM/KG (ref 273–304)
POTASSIUM SERPL-SCNC: 3.7 MMOL/L (ref 3.5–5.3)
SODIUM SERPL-SCNC: 141 MMOL/L (ref 135–147)

## 2020-12-01 ENCOUNTER — OFFICE VISIT CONVERTED (OUTPATIENT)
Dept: GASTROENTEROLOGY | Facility: CLINIC | Age: 55
End: 2020-12-01
Attending: NURSE PRACTITIONER

## 2021-01-26 ENCOUNTER — HOSPITAL ENCOUNTER (OUTPATIENT)
Dept: URGENT CARE | Facility: CLINIC | Age: 56
Discharge: HOME | End: 2021-01-26
Attending: FAMILY MEDICINE

## 2021-01-27 LAB — SARS-COV-2 RNA SPEC QL NAA+PROBE: NOT DETECTED

## 2021-02-03 ENCOUNTER — OFFICE VISIT CONVERTED (OUTPATIENT)
Dept: GASTROENTEROLOGY | Facility: CLINIC | Age: 56
End: 2021-02-03
Attending: NURSE PRACTITIONER

## 2021-02-03 ENCOUNTER — CONVERSION ENCOUNTER (OUTPATIENT)
Dept: GASTROENTEROLOGY | Facility: CLINIC | Age: 56
End: 2021-02-03

## 2021-02-23 ENCOUNTER — HOSPITAL ENCOUNTER (OUTPATIENT)
Dept: OTHER | Facility: HOSPITAL | Age: 56
Discharge: HOME | End: 2021-02-23
Attending: INTERNAL MEDICINE

## 2021-02-23 LAB
ALBUMIN SERPL-MCNC: 4.2 G/DL (ref 3.5–5)
ALBUMIN/GLOB SERPL: 1.8 {RATIO} (ref 1.4–2.6)
ALP SERPL-CCNC: 73 U/L (ref 53–141)
ALT SERPL-CCNC: 7 U/L (ref 10–40)
ANION GAP SERPL CALC-SCNC: 13 MMOL/L (ref 8–19)
AST SERPL-CCNC: 19 U/L (ref 15–50)
BILIRUB SERPL-MCNC: 0.41 MG/DL (ref 0.2–1.3)
BUN SERPL-MCNC: 17 MG/DL (ref 5–25)
BUN/CREAT SERPL: 28 {RATIO} (ref 6–20)
CALCIUM SERPL-MCNC: 9.5 MG/DL (ref 8.7–10.4)
CHLORIDE SERPL-SCNC: 99 MMOL/L (ref 99–111)
CONV CO2: 30 MMOL/L (ref 22–32)
CONV TOTAL PROTEIN: 6.5 G/DL (ref 6.3–8.2)
CREAT UR-MCNC: 0.6 MG/DL (ref 0.5–0.9)
GFR SERPLBLD BASED ON 1.73 SQ M-ARVRAT: >60 ML/MIN/{1.73_M2}
GLOBULIN UR ELPH-MCNC: 2.3 G/DL (ref 2–3.5)
GLUCOSE SERPL-MCNC: 84 MG/DL (ref 65–99)
MAGNESIUM SERPL-MCNC: 1.52 MG/DL (ref 1.6–2.3)
OSMOLALITY SERPL CALC.SUM OF ELEC: 287 MOSM/KG (ref 273–304)
POTASSIUM SERPL-SCNC: 3.9 MMOL/L (ref 3.5–5.3)
SODIUM SERPL-SCNC: 138 MMOL/L (ref 135–147)

## 2021-03-09 ENCOUNTER — OFFICE VISIT CONVERTED (OUTPATIENT)
Dept: NEUROLOGY | Facility: CLINIC | Age: 56
End: 2021-03-09
Attending: PSYCHIATRY & NEUROLOGY

## 2021-03-22 ENCOUNTER — OFFICE VISIT CONVERTED (OUTPATIENT)
Dept: GASTROENTEROLOGY | Facility: CLINIC | Age: 56
End: 2021-03-22
Attending: NURSE PRACTITIONER

## 2021-05-10 NOTE — H&P
History and Physical      Patient Name: Christi Stephens   Patient ID: [id number]   Sex: Female   YOB: 1965    Primary Care Provider: Sadia Baca NP   Referring Provider: Sandor BLANKENSHIP    Visit Date: June 29, 2020    Provider: Junior Lewis MD   Location: [facility]   Location Address: [address] [place] [address]  [place]  [id number]   Location Phone: [phone]          Chief Complaint     New pt here for tremor. CT @ [facility].       History Of Present Illness  Christi Stephens is a 55 year old /White female who presents today to [facility] today referred from Sandor BLANKENSHIP.      55-year-old woman evaluated for abnormal movements of her mouth, hands, body.  She states that she is seeing a psychiatrist and the psychiatrist told her that there is nothing they can do for her.  She has been taking antipsychotic medications for a long time.  She is taking Seroquel for years now.  Before she was taking Abilify, Risperdal, Zyprexa.  She noted movements of her mouth several months ago.  She always feels restless and her body keeps on moving especially her hands and feet and rocking movements of her body.  She states that she cannot keep herself still.  She calls them tremors however there is no tremor.  It is constant wriggling movements of her body and choreoathetotic movements of her fingers and feet.       Past Medical History  Anxiety and depression; Arthritis; Asthma; Bladder Disorder; Carpal tunnel syndrome; Depression; Hypercholesteremia; Lumbago/low back pain; Lumbago/low back pain; Sciatica; Seasonal allergies         Past Surgical History  Carpal Tunnel Release; Cataract surgery; EGD; Tubal ligation         Medication List  Linzess 145 mcg oral capsule; magnesium oxide 400 mg oral capsule; montelukast 10 mg oral tablet; omeprazole 40 mg oral capsule,delayed release(DR/EC); ondansetron 8 mg oral tablet,disintegrating; potassium chloride 20 mEq oral tablet  "extended release; Seroquel 200 mg oral tablet; simvastatin 10 mg oral tablet         Allergy List  NO KNOWN DRUG ALLERGIES         Family Medical History  Esophagus Neoplasm, Malignant; Cancer, Unspecified; Spine Problems; Prostate cancer         Social History  Alcohol (Never); .; lives alone; Recreational Drug Use (Never); Tobacco (Never); Unemployed.         Review of Systems  · Constitutional  o Denies  o : chills, excessive sweating, fatigue, fever, sycope/passing out, weight gain, weight loss  · Eyes  o Denies  o : changes in vision, blurry vision, double vision  · HENT  o Denies  o : loss of hearing, ringing in the ears, ear aches, sore throat, nasal congestion, sinus pain, nose bleeds, seasonal allergies  · Cardiovascular  o Admits  o : easy burising or bleeding  o Denies  o : blood clots, swollen legs, anemia, transfusions  · Respiratory  o Denies  o : shortness of breath, dry cough, productive cough, pneumonia, COPD  · Gastrointestinal  o Denies  o : difficulty swallowing, reflux  · Genitourinary  o Admits  o : incontinence  · Neurologic  o Admits  o : tremor  o Denies  o : headache, seizure, stroke, loss of balance, falls, dizziness/vertigo, difficulty with sleep, numbness/tingling/paresthesia , difficulty with coordination, difficulty with dexterity, weakness  · Musculoskeletal  o Denies  o : neck stiffness/pain, swollen lymph nodes, muscle aches, joint pain, weakness, spasms, sciatica, pain radiating in arm, pain radiating in leg, low back pain  · Endocrine  o Denies  o : diabetes, thyroid disorder  · Psychiatric  o Admits  o : anxiety, depression      Vitals  Date Time BP Position Site L\R Cuff Size HR RR TEMP (F) WT  HT  BMI kg/m2 BSA m2 O2 Sat HC       06/29/2020 11:03 AM        97.5         06/29/2020 11:08 /84 Sitting    93 - R   106lbs 0oz 5'  1\" 20.03 1.44           Physical Examination     She is alert, fluent, phasic, follows commands well.  Visual fields full to confrontation, " EOMs full all directions gaze, facial strength is full.  There is orobucal movements of her mouth and lips, choreoathetotic movements of her fingers and her feet and rocking movements of her body.  There is no weakness of the upper or lower extremities with muscle testing.  Reflexes are decreased in the biceps, triceps, patellar's and ankles.  Cerebellar testing is intact.  Station and gait she is able to tiptoe, heel walk, benita and tandem with exacerbated choreoathetotic movements of her fingers.  Heart is regular rhythm and rate.           Assessment  · Tardive dyskinesia     333.85/G24.01  I told her that she has tardive dyskinesias from chronic antipsychotic use. I will refer to the [facility] movement disorder clinic to see if they can help her.    Problems Reconciled  Plan  · Orders  o NEUROLOGY CONSULTATION. (NEURO) - 333.85/G24.01 - 06/29/2020   [facility]  · Medications  o Medications have been Reconciled  o Transition of Care or Provider Policy  · Instructions  o Encouraged to follow-up with Primary Care Provider for preventative care.            Electronically Signed by: Junior Lewis MD -Author on June 29, 2020 11:52:52 AM

## 2021-05-12 NOTE — PROGRESS NOTES
"   Quick Note      Patient Name: Christi Stephens   Patient ID: [id number]   Sex: Female   YOB: 1965    Primary Care Provider: Sadia Baca NP   Referring Provider: Sandor BLANKENSHIP    Visit Date: April 30, 2020    Provider: AYLIN Nathan   Location: [facility]   Location Address: [address] [place] [address], [address]  [place], [place]  [id number]   Location Phone: [phone]          History Of Present Illness  TELEHEALTH TELEPHONE VISIT  Chief Complaint: Follow-up anorexia, constipation and nausea.   Christi Stephens is a 55 year old /White female who is presenting for evaluation via telehealth telephone visit. Verbal consent obtained before beginning visit.   Provider spent 6 minutes with the patient during telehealth visit.   The following staff were present during this visit: Savannah Andrea MA, AYLIN Nathan   Past Medical History/Overview of Patient Symptoms       12/17/2019 EGD-normal esophagus.  Small hiatal hernia.  Erythema in the antrum, biopsy normal.  A few polyps (4-5 mm) in the fundus, biopsy-fundic gland polyps.  Normal duodenum.    She states that nausea is some better, but is still present every morning.   She states that normally it gets beter as the day goes on.  It is not effected by meals. Previously tried Zofran with some improvement.      Reports that amitiza is working well for constipation.  She reports a regular bowel movement.  Denies rectal bleeding.       Vitals  Date Time BP Position Site L\R Cuff Size HR RR TEMP (F) WT  HT  BMI kg/m2 BSA m2 O2 Sat HC       04/30/2020 01:44 PM         100lbs 0oz 5'  1\" 18.89 1.4               Assessment  · Constipation     564.00/K59.00  · Nausea     787.02/R11.0    Problems Reconciled  Plan  · Orders  o Physican Telephone evaluation, 5-10 min (88979) - - 04/30/2020  · Medications  o ondansetron 8 mg oral tablet,disintegrating   SIG: dissolve 1 tablet by oral route every 8 hours as needed nausea   DISP: " (20) tablets with 1 refills  Prescribed on 04/30/2020     o Medications have been Reconciled  · Disposition  o Follow up 3 months            Electronically Signed by: AYLIN Nathan -Author on April 30, 2020 02:31:29 PM

## 2021-05-13 NOTE — PROGRESS NOTES
Progress Note      Patient Name: Christi Stephens   Patient ID: [id number]   Sex: Female   YOB: 1965    Primary Care Provider: Sadia Baca NP   Referring Provider: Sandor BLANKENSHIP    Visit Date: June 1, 2020    Provider: AYLIN Nathan   Location: [facility]   Location Address: [address] [place] [address], [address]  [place], [place]  [id number]   Location Phone: [phone]          Chief Complaint  · Follow up Constipation  · Follow up Nausea      History Of Present Illness     Ms. Booker is a 55-year-old female who presents for follow-up of constipation and nausea.     She reports that nausea is continuing.  She states that it's present upon waking and lasts throughout the day.  She reports that it is worsened with meals.  Improved with Zofran.      She reports a daily bowel movement with amitiza.  She is only drinking one bottle of water per day.             Past Medical History  Anxiety and depression; Arthritis; Asthma; Bladder Disorder; Carpal tunnel syndrome; Depression; Hypercholesteremia; Lumbago/low back pain; Lumbago/low back pain; Sciatica; Seasonal allergies         Past Surgical History  Carpal Tunnel Release; Cataract surgery; EGD; Tubal ligation         Medication List  Amitiza 24 mcg oral capsule; magnesium oxide 400 mg oral capsule; montelukast 10 mg oral tablet; omeprazole 40 mg oral capsule,delayed release(DR/EC); ondansetron 8 mg oral tablet,disintegrating; potassium chloride 20 mEq oral tablet extended release; Seroquel 200 mg oral tablet; simvastatin 10 mg oral tablet         Allergy List  NO KNOWN DRUG ALLERGIES       Allergies Reconciled  Family Medical History  Esophagus Neoplasm, Malignant; Cancer, Unspecified; Spine Problems; Prostate cancer         Social History  Alcohol (Never); .; lives alone; Recreational Drug Use (Never); Tobacco (Never); Unemployed.         Review of Systems  · Constitutional  o Denies  o : chills,  "fever  · Cardiovascular  o Denies  o : chest pain, irregular heart beats  · Respiratory  o Denies  o : cough, shortness of breath  · Gastrointestinal  o Admits  o : see HPI   · Endocrine  o Denies  o : weight gain, weight loss      Vitals  Date Time BP Position Site L\R Cuff Size HR RR TEMP (F) WT  HT  BMI kg/m2 BSA m2 O2 Sat HC       06/01/2020 09:26 AM 96/44 Sitting      98.2 103lbs 8oz 5'  1\" 19.56 1.42           Physical Examination  · Constitutional  o Appearance  o : Healthy-appearing, awake and alert in no acute distress  · Head and Face  o Head  o : Normocephalic with no worriesome skin lesions  · Eyes  o Vision  o :   § Visual Fields  § : eyes move symmetrical in all directions  o Sclerae  o : sclerae anicteric  o Pupils and Irises  o : pupils equal and symmetrical  · Neck  o Inspection/Palpation  o : Trachea is midline, no adenopathy  · Respiratory  o Respiratory Effort  o : Breathing is unlabored.  o Inspection of Chest  o : normal appearance  o Auscultation of Lungs  o : Chest is clear to auscultation bilaterally.  · Cardiovascular  o Heart  o :   § Auscultation of Heart  § : no murmurs, rubs, or gallops  o Peripheral Vascular System  o :   § Extremities  § : no cyanosis, clubbing or edema;   · Gastrointestinal  o Abdominal Examination  o : mild epigastric region tenderness to palpation present, normal bowel sounds, tone normal without rigidity or guarding, no masses present, abdomen scaphoid upon supine, no ascites  o Digital Rectal Exam  o : deferred  · Skin and Subcutaneous Tissue  o General Inspection  o : without focal lesions; turgor is normal  · Psychiatric  o General  o : Alert and oriented x3  o Mood and Affect  o : Mood and affect are appropriate to circumstances  · Extremities  o Extremities  o : No edema, no cyanosis          Assessment  · Abdominal Pain, Epigastric     789.06/R10.13  · Constipation     564.00/K59.00  · Nausea     787.02/R11.0    Problems Reconciled  Plan  · Orders  o HIDA " scan (38287) - - 06/01/2020  · Medications  o omeprazole 40 mg oral capsule,delayed release(DR/EC)   SIG: take 1 capsule (40 mg) by oral route once daily before breakfast for 30 days   DISP: (30) Capsule with 5 refills  Refilled on 06/01/2020     o ondansetron 8 mg oral tablet,disintegrating   SIG: dissolve 1 tablet by oral route every 8 hours as needed nausea   DISP: (30) tablets with 1 refills  Refilled on 06/01/2020     o Medications have been Reconciled  o Transition of Care or Provider Policy  · Instructions  o Information given on current diagnoses.  · Disposition  o Follow up 2 months            Electronically Signed by: AYLIN Nathan -Author on June 1, 2020 04:37:02 PM

## 2021-05-13 NOTE — PROGRESS NOTES
Progress Note      Patient Name: Christi Stephens   Patient ID: [id number]   Sex: Female   YOB: 1965    Primary Care Provider: Sadia Baca NP   Referring Provider: Sadia Baca NP    Visit Date: August 26, 2020    Provider: AYLIN Nathan   Location: [facility]   Location Address: [address] [place] [address], [address]  [place], [place]  [id number]   Location Phone: [phone]          History Of Present Illness     Ms. Stephens presents for f/u of epigastric pain, nausea and constipation.      She states that nausea is resolved.  She stopped taking Linzess and diarrhea is now resolved as well.    However, she is now experiencing abdominal pain.  She was evaluated in the emergency department on Monday however no imaging was obtained.  She was told that she had gastritis and was discharged with zegerid.  She is requesting an EGD.  Previous EGD 12/2019 - normal.      She reports that she continues to experience pain in epigastric region.  States that pain is constant.  It's worse when she eats, regardless of what she eats.  She feels that Carafate prescribed at last visit (last month) helped pain some.           Past Medical History  Anxiety and depression; Arthritis; Asthma; Bladder Disorder; Carpal tunnel syndrome; Depression; Hypercholesteremia; Lumbago/low back pain; Lumbago/low back pain; Sciatica; Seasonal allergies         Past Surgical History  Carpal Tunnel Release; Cataract surgery; EGD; Tubal ligation         Medication List  Linzess 72 mcg oral capsule; magnesium oxide 400 mg oral capsule; montelukast 10 mg oral tablet; omeprazole 40 mg oral capsule,delayed release(DR/EC); ondansetron 8 mg oral tablet,disintegrating; potassium chloride 20 mEq oral tablet extended release; propranolol 10 mg oral tablet; Seroquel 200 mg oral tablet; simvastatin 10 mg oral tablet; Trintellix 10 mg oral tablet         Allergy List  NO KNOWN DRUG ALLERGIES       Allergies Reconciled  Family  "Medical History  Esophagus Neoplasm, Malignant; Cancer, Unspecified; Spine Problems; Prostate cancer         Social History  Alcohol (Never); .; lives alone; Recreational Drug Use (Never); Tobacco (Never); Unemployed.         Review of Systems  · Constitutional  o Denies  o : chills, fever  · Cardiovascular  o Denies  o : chest pain, irregular heart beats  · Respiratory  o Denies  o : cough, shortness of breath  · Gastrointestinal  o Admits  o : see HPI   · Endocrine  o Admits  o : weight loss  o Denies  o : weight gain      Vitals  Date Time BP Position Site L\R Cuff Size HR RR TEMP (F) WT  HT  BMI kg/m2 BSA m2 O2 Sat HC       08/26/2020 02:46 /71 Sitting      98.8 95lbs 4oz 5'  1\" 18 1.36           Physical Examination  · Constitutional  o Appearance  o : Healthy-appearing, awake and alert in no acute distress  · Head and Face  o Head  o : Normocephalic with no worriesome skin lesions  · Eyes  o Vision  o :   § Visual Fields  § : eyes move symmetrical in all directions  o Sclerae  o : sclerae anicteric  o Pupils and Irises  o : pupils equal and symmetrical  · Neck  o Inspection/Palpation  o : Trachea is midline, no adenopathy  · Respiratory  o Respiratory Effort  o : Breathing is unlabored.  o Inspection of Chest  o : normal appearance  o Auscultation of Lungs  o : Chest is clear to auscultation bilaterally.  · Cardiovascular  o Heart  o :   § Auscultation of Heart  § : no murmurs, rubs, or gallops  o Peripheral Vascular System  o :   § Extremities  § : no cyanosis, clubbing or edema;   · Gastrointestinal  o Abdominal Examination  o : mild left-upper quadrant epigastric region tenderness to palpation present, normal bowel sounds, tone normal without rigidity or guarding, no masses present, abdomen scaphoid upon supine, no ascites  o Digital Rectal Exam  o : deferred  · Skin and Subcutaneous Tissue  o General Inspection  o : without focal lesions; turgor is normal  · Psychiatric  o General  o : Alert " and oriented x3  o Mood and Affect  o : Mood and affect are appropriate to circumstances  · Extremities  o Extremities  o : No edema, no cyanosis          Assessment  · Abdominal bloating     787.3/R14.0  · Abdominal Pain, Epigastric     789.06/R10.13  · Abdominal Pain, LUQ     789.02/R10.12  · Gastritis, Other specified     535.40  · Weight Loss     783.21/R63.4      Plan  · Orders  o CT Abdomen with IV Contrast [facility]; suggest Oral Prep (23125) - - 08/26/2020  · Medications  o Linzess 72 mcg oral capsule   SIG: take 1 capsule (72 mcg) by oral route once daily on an empty stomach at least 30 minutes before 1st meal of the day   DISP: (30) capsules with 5 refills  Discontinued on 08/26/2020     o Medications have been Reconciled  o Transition of Care or Provider Policy  · Instructions  o Information given on current diagnoses. If CT negative, consider repeating EGD.  · Disposition  o Follow up 3 months            Electronically Signed by: AYLIN Nathan -Author on August 26, 2020 03:26:20 PM

## 2021-05-13 NOTE — PROGRESS NOTES
Progress Note      Patient Name: Christi Stephens   Patient ID: [id number]   Sex: Female   YOB: 1965    Primary Care Provider: Sadia Baca NP   Referring Provider: Sadia Baca NP    Visit Date: December 1, 2020    Provider: AYLIN Nathan   Location: [facility]   Location Address: [address] [place] [address], [address] [place] [address]  [id number]   Location Phone: [phone]          Chief Complaint  · Follow-up of EGD      History Of Present Illness     Ms. Stephens presents for follow-up of abdominal bloating, epigastric pain, left upper quadrant pain and weight loss.    8/31/2020 CT abdomen with contrast-no acute findings.  Moderate stool burden.    11/5/2020 EGD-normal mucosa in the whole esophagus.  Small hiatal hernia.  Normal mucosa in the whole stomach, biopsy-mild chronic focally active gastritis. A few polyps (3-5 mm) in the fundus and stomach body.  Normal mucosa in the duodenum.    She reports that nausea is continuing to experience nausea.  States that its worse when she eats.  Denies vomiting.      She reports that she's continuing to experience abdominal pain.  She reports constipation and states that she's having a bowel movement about once per week.       Past Medical History  Anxiety and depression; Arthritis; Asthma; Bladder Disorder; Carpal tunnel syndrome; Depression; Hypercholesteremia; Lumbago/low back pain; Lumbago/low back pain; Sciatica; Seasonal allergies         Past Surgical History  Carpal Tunnel Release; Cataract surgery; EGD; Tubal ligation         Medication List  Linzess 72 mcg oral capsule; magnesium oxide 400 mg oral capsule; montelukast 10 mg oral tablet; omeprazole 40 mg oral capsule,delayed release(DR/EC); ondansetron 8 mg oral tablet,disintegrating; potassium chloride 20 mEq oral tablet extended release; propranolol 10 mg oral tablet; Seroquel 200 mg oral tablet; simvastatin 10 mg oral tablet; Trintellix 10 mg oral tablet  "        Allergy List  NO KNOWN DRUG ALLERGIES       Allergies Reconciled  Family Medical History  Esophagus Neoplasm, Malignant; Cancer, Unspecified; Spine Problems; Prostate cancer         Social History  Alcohol (Never); .; lives alone; Recreational Drug Use (Never); Tobacco (Never); Unemployed.         Review of Systems  · Constitutional  o Denies  o : chills, fever  · Cardiovascular  o Denies  o : chest pain, irregular heart beats  · Respiratory  o Denies  o : cough, shortness of breath  · Gastrointestinal  o Admits  o : see HPI   · Endocrine  o Denies  o : weight gain, weight loss      Vitals  Date Time BP Position Site L\R Cuff Size HR RR TEMP (F) WT  HT  BMI kg/m2 BSA m2 O2 Sat FR L/min FiO2 HC       12/01/2020 01:51 /57 Sitting      97.9 98lbs 2oz 5'  1\" 18.54 1.38             Physical Examination  · Constitutional  o Appearance  o : Healthy-appearing, awake and alert in no acute distress  · Head and Face  o Head  o : Normocephalic with no worriesome skin lesions  · Eyes  o Vision  o :   § Visual Fields  § : eyes move symmetrical in all directions  o Sclerae  o : sclerae anicteric  o Pupils and Irises  o : pupils equal and symmetrical  · Neck  o Inspection/Palpation  o : Trachea is midline, no adenopathy  · Respiratory  o Respiratory Effort  o : Breathing is unlabored.  o Inspection of Chest  o : normal appearance  o Auscultation of Lungs  o : Chest is clear to auscultation bilaterally.  · Cardiovascular  o Heart  o :   § Auscultation of Heart  § : no murmurs, rubs, or gallops  o Peripheral Vascular System  o :   § Extremities  § : no cyanosis, clubbing or edema;   · Gastrointestinal  o Abdominal Examination  o : Abdomen is soft, nontender to palpation, with normal active bowel sounds, no appreciable hepatosplenomegaly.  o Digital Rectal Exam  o : deferred  · Skin and Subcutaneous Tissue  o General Inspection  o : without focal lesions; turgor is normal  · Psychiatric  o General  o : Alert and " oriented x3  o Mood and Affect  o : Mood and affect are appropriate to circumstances  · Extremities  o Extremities  o : No edema, no cyanosis          Assessment  · Abdominal Pain, Generalized     789.07/R10.84  · Constipation     564.00/K59.00  · Gastritis, Other specified     535.40  · Nausea     787.02/R11.0      Plan  · Medications  o Linzess 72 mcg oral capsule   SIG: take 1 capsule (72 mcg) by oral route once daily on an empty stomach at least 30 minutes before 1st meal of the day   DISP: (30) Capsule with 3 refills  Prescribed on 12/01/2020     o omeprazole 40 mg oral capsule,delayed release(DR/EC)   SIG: take 1 capsule (40 mg) by oral route once daily before breakfast for 30 days   DISP: (30) Capsule with 5 refills  Refilled on 12/01/2020     o Medications have been Reconciled  · Instructions  o Information given on current diagnoses.  · Disposition  o Follow up 6 months            Electronically Signed by: AYLIN Nathan -Author on December 2, 2020 11:33:06 AM

## 2021-05-13 NOTE — PROGRESS NOTES
Progress Note      Patient Name: Christi Stephens   Patient ID: [id number]   Sex: Female   YOB: 1965    Primary Care Provider: Sadia Baca NP   Referring Provider: Sadia Baca NP    Visit Date: July 14, 2020    Provider: AYLIN Nathan   Location: [facility]   Location Address: [address] [place] [address], [address]  [place], [place]  [id number]   Location Phone: [phone]          Chief Complaint  · Follow up Nausea      History Of Present Illness     Ms. Stephens presents for f/u of abdominal pain, nausea and constipation.      She was recently diagnosed with tardive dyskinesia secondary to long term antipsychotic medications.      She reports that she's still experiencing nausea and diarrhea.      States that she's having diarrhea 2-3 times per day.  Continues to take linzess.      She reports that Zofran was helping at first, but is no longer helping.  She states that nausea lasts throughout the day.  States that she's forcing herself to eat.             Past Medical History  Anxiety and depression; Arthritis; Asthma; Bladder Disorder; Carpal tunnel syndrome; Depression; Hypercholesteremia; Lumbago/low back pain; Lumbago/low back pain; Sciatica; Seasonal allergies         Past Surgical History  Carpal Tunnel Release; Cataract surgery; EGD; Tubal ligation         Medication List  Linzess 72 mcg oral capsule; magnesium oxide 400 mg oral capsule; montelukast 10 mg oral tablet; omeprazole 40 mg oral capsule,delayed release(DR/EC); ondansetron 8 mg oral tablet,disintegrating; potassium chloride 20 mEq oral tablet extended release; propranolol 10 mg oral tablet; Seroquel 200 mg oral tablet; simvastatin 10 mg oral tablet; Trintellix 10 mg oral tablet         Allergy List  NO KNOWN DRUG ALLERGIES       Allergies Reconciled  Family Medical History  Esophagus Neoplasm, Malignant; Cancer, Unspecified; Spine Problems; Prostate cancer         Social History  Alcohol (Never); .; lives  "alone; Recreational Drug Use (Never); Tobacco (Never); Unemployed.         Review of Systems  · Constitutional  o Denies  o : chills, fever  · Cardiovascular  o Denies  o : chest pain, irregular heart beats  · Respiratory  o Denies  o : cough, shortness of breath  · Gastrointestinal  o Admits  o : see HPI   · Endocrine  o Admits  o : weight loss  o Denies  o : weight gain      Vitals  Date Time BP Position Site L\R Cuff Size HR RR TEMP (F) WT  HT  BMI kg/m2 BSA m2 O2 Sat        07/14/2020 08:51 /41 Sitting      98.2 100lbs 6oz 5'  1\" 18.97 1.4           Physical Examination  · Constitutional  o Appearance  o : Healthy-appearing, awake and alert in no acute distress  · Head and Face  o Head  o : Normocephalic with no worriesome skin lesions  · Eyes  o Vision  o :   § Visual Fields  § : eyes move symmetrical in all directions  o Sclerae  o : sclerae anicteric  o Pupils and Irises  o : pupils equal and symmetrical  · Neck  o Inspection/Palpation  o : Trachea is midline, no adenopathy  · Respiratory  o Respiratory Effort  o : Breathing is unlabored.  o Inspection of Chest  o : normal appearance  o Auscultation of Lungs  o : Chest is clear to auscultation bilaterally.  · Cardiovascular  o Heart  o :   § Auscultation of Heart  § : no murmurs, rubs, or gallops  o Peripheral Vascular System  o :   § Extremities  § : no cyanosis, clubbing or edema;   · Gastrointestinal  o Abdominal Examination  o : mild epigastric region tenderness to palpation present, normal bowel sounds, tone normal without rigidity or guarding, no masses present, abdomen scaphoid upon supine, no ascites  o Digital Rectal Exam  o : deferred  · Skin and Subcutaneous Tissue  o General Inspection  o : without focal lesions; turgor is normal  · Psychiatric  o General  o : Alert and oriented x3  o Mood and Affect  o : Mood and affect are appropriate to circumstances  · Extremities  o Extremities  o : No edema, no " cyanosis          Assessment  · Abdominal Pain, Epigastric     789.06/R10.13  · Constipation     564.00/K59.00  · Nausea     787.02/R11.0    Problems Reconciled  Plan  · Medications  o Carafate 1 gram oral tablet   SIG: take 1 tablet (1 gram) by oral route 4 times per day on an empty stomach 1 hour before meals and at bedtime for 14 days   DISP: (56) tablets with 0 refills  Prescribed on 07/14/2020     o Linzess 72 mcg oral capsule   SIG: take 1 capsule (72 mcg) by oral route once daily on an empty stomach at least 30 minutes before 1st meal of the day   DISP: (30) capsules with 5 refills  Adjusted on 07/14/2020     o Medications have been Reconciled  o Transition of Care or Provider Policy  · Instructions  o Information given on current diagnoses. 2 week course of Carafate. Encouraged 1-2 meal replacement shakes per day. Decrease linzess to 72 mcg secondary to diarrhea.  · Disposition  o Follow up 3 months            Electronically Signed by: AYLIN Nathan -Author on July 14, 2020 09:12:47 AM

## 2021-05-14 VITALS
SYSTOLIC BLOOD PRESSURE: 138 MMHG | BODY MASS INDEX: 18.15 KG/M2 | DIASTOLIC BLOOD PRESSURE: 116 MMHG | HEART RATE: 97 BPM | HEIGHT: 61 IN | TEMPERATURE: 97.5 F | WEIGHT: 96.12 LBS

## 2021-05-14 VITALS
DIASTOLIC BLOOD PRESSURE: 73 MMHG | SYSTOLIC BLOOD PRESSURE: 121 MMHG | BODY MASS INDEX: 18.57 KG/M2 | WEIGHT: 98.37 LBS | HEART RATE: 133 BPM | HEIGHT: 61 IN

## 2021-05-14 VITALS
BODY MASS INDEX: 19.28 KG/M2 | TEMPERATURE: 96.9 F | SYSTOLIC BLOOD PRESSURE: 135 MMHG | HEIGHT: 61 IN | DIASTOLIC BLOOD PRESSURE: 116 MMHG | WEIGHT: 102.12 LBS | HEART RATE: 73 BPM

## 2021-05-14 VITALS
TEMPERATURE: 97.9 F | SYSTOLIC BLOOD PRESSURE: 129 MMHG | DIASTOLIC BLOOD PRESSURE: 57 MMHG | BODY MASS INDEX: 18.53 KG/M2 | HEIGHT: 61 IN | WEIGHT: 98.12 LBS

## 2021-05-14 VITALS
TEMPERATURE: 98.8 F | DIASTOLIC BLOOD PRESSURE: 71 MMHG | HEIGHT: 61 IN | BODY MASS INDEX: 17.98 KG/M2 | SYSTOLIC BLOOD PRESSURE: 124 MMHG | WEIGHT: 95.25 LBS

## 2021-05-14 NOTE — PROGRESS NOTES
Progress Note      Patient Name: Christi Stephens   Patient ID: [id number]   Sex: Female   YOB: 1965    Primary Care Provider: Sadia Baca NP   Referring Provider: Sadia Baca NP    Visit Date: March 9, 2021    Provider: Junior Lewis MD   [facility]   Location Address: [address] [place] [address]  [id number]   Location Phone: [phone]          Chief Complaint     Pt here for f/u       History Of Present Illness  Christi Stephens is a 55 year old /White female who presents today to [facility] today referred from Sadia Baca NP.      55-year-old woman here for follow-up of her tardive dyskinesia.  She was seen at [facility] and was started on Austedo twice a day.  She was not followed up.  The dose was not increased.  She states that it does not help her significantly.  She and her father moving to [place].  They want to know where they can go to see a neurologist.       Past Medical History  Anxiety and depression; Arthritis; Asthma; Bladder disorder; Carpal tunnel syndrome; Depression; Hypercholesteremia; Lumbago/low back pain; Lumbago/low back pain; Sciatica; Seasonal allergies         Past Surgical History  Carpal Tunnel Release; Cataract surgery; EGD; Tubal ligation         Medication List  Austedo 12 mg oral tablet; dicyclomine 20 mg oral tablet; Linzess 290 mcg oral capsule; lorazepam 0.5 mg oral tablet; magnesium oxide 400 mg oral capsule; montelukast 10 mg oral tablet; nitrofurantoin monohyd/m-cryst 100 mg oral capsule; omeprazole 40 mg oral capsule,delayed release(DR/EC); ondansetron 8 mg oral tablet,disintegrating; phenazopyridine 200 mg oral tablet; potassium chloride 20 mEq oral tablet extended release; promethazine 12.5 mg oral tablet; propranolol 10 mg oral tablet; simvastatin 10 mg oral tablet         Allergy List  NO KNOWN DRUG ALLERGIES         Family Medical History  Esophagus Neoplasm,  "Malignant; Cancer, Unspecified; Spine Problems; Prostate cancer         Social History  Alcohol (Never); .; lives alone; Recreational Drug Use (Never); Tobacco (Never); Unemployed.         Review of Systems  · Constitutional  o Denies  o : chills, excessive sweating, fatigue, fever, sycope/passing out, weight gain, weight loss  · Eyes  o Denies  o : changes in vision, blurred vision, double vision  · HENT  o Denies  o : hearing loss, ringing in the ears, ear aches, sore throat, nasal congestion, sinus pain, nose bleeds, seasonal allergies  · Cardiovascular  o Denies  o : blood clots, swollen legs, anemia, easy burising or bleeding, transfusions  · Respiratory  o Denies  o : shortness of breath, dry cough, productive cough, pneumonia, COPD  · Gastrointestinal  o Denies  o : dysphagia, reflux  · Genitourinary  o Denies  o : incontinence  · Neurologic  o Admits  o : tremor, loss of balance, difficulty with sleep, difficulty with coordination, weakness  o Denies  o : headache, seizure, stroke, falls, dizziness/vertigo, numbness/tingling/paresthesia , difficulty with dexterity  · Musculoskeletal  o Denies  o : neck stiffness/pain, swollen lymph nodes, muscle aches, joint pain, weakness, spasms, sciatica, pain radiating in arm, pain radiating in leg, low back pain  · Endocrine  o Denies  o : diabetes, thyroid disorder  · Psychiatric  o Denies  o : anxiety, depression      Vitals  Date Time BP Position Site L\R Cuff Size HR RR TEMP (F) WT  HT  BMI kg/m2 BSA m2 O2 Sat FR L/min FiO2 HC       03/09/2021 11:20 /73 Sitting    133 - R   98lbs 6oz 5'  1\" 18.59 1.39             Physical Examination     She continues to have tardive dyskinetic movements.  He is able to ambulate without difficulty.           Assessment  · Tardive dyskinesia     333.85/G24.01  I looked up the Internet and gave him the address as well as the phone number for [facility] in [place]. I discussed with her and her father that " Austedo can be increased to a higher dose however it has to be monitored by a physician and I am not willing to do that for her when she is leaving. They have no further questions at this time.    Total time spent with patient and coordinating patient care was 15 minutes.      Plan  · Medications  o Medications have been Reconciled  o Transition of Care or Provider Policy  · Instructions  o Encouraged to follow-up with Primary Care Provider for preventative care.            Electronically Signed by: Junior Lewis MD -Author on March 9, 2021 11:45:09 AM

## 2021-05-14 NOTE — PROGRESS NOTES
Progress Note      Patient Name: Christi Stephens   Patient ID: [id number]   Sex: Female   YOB: 1965    Primary Care Provider: Sadia Baca NP   Referring Provider: Sadia Baca NP    Visit Date: February 3, 2021    Provider: AYLIN Nathan   [facility]   Location Address: [address] [place] [address], [address]  [place], [place]  [id number]   Location Phone: [phone]          Chief Complaint  · Follow up Abdominal pain       History Of Present Illness     Ms. Stephens presents for follow-up of generalized abdominal pain, gastritis, nausea and constipation.    Since her last visit on December 1, she has been to the emergency department on numerous occasions for generalized complaints of abdominal pain, stomach cramps and nausea.  Most recent ER visit was reviewed:  2/2/2021 CT abdomen pelvis with contrast-trace amount of pericardial effusion.  Formed stool throughout the colon, otherwise normal CT.    2/2/2021 flat and upright of the abdomen-nonobstructive bowel gas pattern is seen.    2/1/2021 CBC: Hemoglobin 14, hematocrit 43.4, platelets 260.  CMP: Creatinine 0.7, alk phos 83, AST 20, ALT 11, total bilirubin 0.39.  Lipase 38.    She does not appear to be in distress at today's visit.  She continues to experience tremors and is rocking back in forth in her chair today.  States that she has a f/u with neurology.      Reports that bentyl was prescribed in the ER on one of visits and she felt that it was helpful.  Reports that abdominal pain is present daily, but often only lasts for a short amount of time.  She describes it to be sharp, shooting pain that is near umbilicus but doesn't last very long.  Other times, she describes it as a dull ache.      She began taking 2 linzess 145 mcg daily as she didn't feel that 1 was effective.  She is now having a bowel movement about every other day.      Continues to experience nausea despite Zofran.  She does not feel  "that it's helpful.                 Past Medical History  Anxiety and depression; Arthritis; Asthma; Bladder Disorder; Carpal tunnel syndrome; Depression; Hypercholesteremia; Lumbago/low back pain; Lumbago/low back pain; Sciatica; Seasonal allergies         Past Surgical History  Carpal Tunnel Release; Cataract surgery; EGD; Tubal ligation         Medication List  Austedo 12 mg oral tablet; Linzess 290 mcg oral capsule; lorazepam 0.5 mg oral tablet; magnesium oxide 400 mg oral capsule; montelukast 10 mg oral tablet; nitrofurantoin monohyd/m-cryst 100 mg oral capsule; omeprazole 40 mg oral capsule,delayed release(DR/EC); ondansetron 8 mg oral tablet,disintegrating; phenazopyridine 200 mg oral tablet; potassium chloride 20 mEq oral tablet extended release; promethazine 12.5 mg oral tablet; propranolol 10 mg oral tablet; simvastatin 10 mg oral tablet         Allergy List  NO KNOWN DRUG ALLERGIES       Allergies Reconciled  Family Medical History  Esophagus Neoplasm, Malignant; Cancer, Unspecified; Spine Problems; Prostate cancer         Social History  Alcohol (Never); .; lives alone; Recreational Drug Use (Never); Tobacco (Never); Unemployed.         Review of Systems  · Constitutional  o Denies  o : chills, fever  · Cardiovascular  o Denies  o : chest pain, irregular heart beats  · Respiratory  o Denies  o : cough, shortness of breath  · Gastrointestinal  o Admits  o : see HPI   · Endocrine  o Denies  o : weight gain, weight loss      Vitals  Date Time BP Position Site L\R Cuff Size HR RR TEMP (F) WT  HT  BMI kg/m2 BSA m2 O2 Sat FR L/min FiO2 HC       02/03/2021 11:23 /116 Sitting    73 - R  96.9 102lbs 2oz 5'  1\" 19.3 1.41             Physical Examination  · Constitutional  o Appearance  o : Healthy-appearing, awake and alert in no acute distress  · Head and Face  o Head  o : Normocephalic with no worriesome skin lesions  · Eyes  o Vision  o :   § Visual Fields  § : eyes move symmetrical in all " directions  o Sclerae  o : sclerae anicteric  o Pupils and Irises  o : pupils equal and symmetrical  · Neck  o Inspection/Palpation  o : Trachea is midline, no adenopathy  · Respiratory  o Respiratory Effort  o : Breathing is unlabored.  o Inspection of Chest  o : normal appearance  o Auscultation of Lungs  o : Chest is clear to auscultation bilaterally.  · Cardiovascular  o Heart  o :   § Auscultation of Heart  § : no murmurs, rubs, or gallops  o Peripheral Vascular System  o :   § Extremities  § : no cyanosis, clubbing or edema;   · Gastrointestinal  o Abdominal Examination  o : Abdomen is soft, nontender to palpation, with normal active bowel sounds, no appreciable hepatosplenomegaly.  o Digital Rectal Exam  o : deferred  · Skin and Subcutaneous Tissue  o General Inspection  o : without focal lesions; turgor is normal  · Psychiatric  o General  o : Alert and oriented x3  o Mood and Affect  o : Mood and affect are appropriate to circumstances  · Extremities  o Extremities  o : generalized tremors          Assessment  · Abdominal Pain, Generalized     789.07/R10.84  · Irritable bowel syndrome with constipation     564.1/K58.1  · Nausea     787.02/R11.0      Plan  · Medications  o dicyclomine 20 mg oral tablet   SIG: take 1 tablet (20 mg) by oral route 3 times per day   DISP: (120) Tablet with 1 refills  Prescribed on 02/03/2021     o Linzess 290 mcg oral capsule   SIG: take 1 capsule (290 mcg) by oral route once daily on an empty stomach at least 30 minutes before 1st meal of the day   DISP: (90) Capsule with 1 refills  Adjusted on 02/03/2021     o omeprazole 40 mg oral capsule,delayed release(DR/EC)   SIG: take 1 capsule (40 mg) by oral route once daily before breakfast for 30 days   DISP: (30) Capsule with 5 refills  Refilled on 02/03/2021     o Medications have been Reconciled  · Instructions  o Information given on current diagnoses. Reassurance provided regarding abdominal discomfort. Pain is likely related to  IBS. Will increase linzess and add bentyl.   · Disposition  o Follow up 4 months            Electronically Signed by: AYLIN Nathan -Author on February 3, 2021 01:09:02 PM

## 2021-05-14 NOTE — PROGRESS NOTES
Progress Note      Patient Name: Christi Stephens   Patient ID: [id number]   Sex: Female   YOB: 1965    Primary Care Provider: Sadia Baca NP   Referring Provider: Sadia Baca NP    Visit Date: March 22, 2021    Provider: AYLIN Nathan   Location: [facility]   Location Address: [address] [place] [address], [address]  [place], [place]  [id number]   Location Phone: [phone]          Chief Complaint  · Follow up IBS      History Of Present Illness     Ms. Stephens presents for f/u of IBS-C and nausea.      She is moving to [place] 3/31.  She would like 2 months supply of medications in preparation for her move.      She's having a bowel movement about every other week.  Has some diarrhea occasionally.  States that she has noticed some discomfort on left side.  She's unsure if she's taking dicyclomine as prescribed.  States that overall, she's feeling good.      Has neurology appointment in [place].       Past Medical History  Anxiety and depression; Arthritis; Asthma; Bladder disorder; Carpal tunnel syndrome; Depression; Hypercholesteremia; Lumbago/low back pain; Lumbago/low back pain; Sciatica; Seasonal allergies         Past Surgical History  Carpal Tunnel Release; Cataract surgery; EGD; Tubal ligation         Medication List  Austedo 12 mg oral tablet; dicyclomine 20 mg oral tablet; Florajen3 460 mg (7.5-6- 1.5 bill. cell) oral capsule; Linzess 290 mcg oral capsule; lorazepam 0.5 mg oral tablet; magnesium oxide 400 mg oral capsule; montelukast 10 mg oral tablet; nitrofurantoin monohyd/m-cryst 100 mg oral capsule; omeprazole 40 mg oral capsule,delayed release(DR/EC); ondansetron 8 mg oral tablet,disintegrating; phenazopyridine 200 mg oral tablet; potassium chloride 20 mEq oral tablet extended release; promethazine 12.5 mg oral tablet; propranolol 10 mg oral tablet; simvastatin 10 mg oral tablet         Allergy List  NO KNOWN DRUG ALLERGIES       Allergies  "Reconciled  Family Medical History  Esophagus Neoplasm, Malignant; Cancer, Unspecified; Spine Problems; Prostate cancer         Social History  Alcohol (Never); .; lives alone; Recreational Drug Use (Never); Tobacco (Never); Unemployed.         Review of Systems  · Constitutional  o Denies  o : chills, fever  · Cardiovascular  o Denies  o : chest pain, irregular heart beats  · Respiratory  o Denies  o : cough, shortness of breath  · Gastrointestinal  o Admits  o : see HPI   · Endocrine  o Denies  o : weight gain, weight loss      Vitals  Date Time BP Position Site L\R Cuff Size HR RR TEMP (F) WT  HT  BMI kg/m2 BSA m2 O2 Sat FR L/min FiO2 HC       03/22/2021 09:28 /116 Sitting    97 - R  97.5 96lbs 2oz 5'  1\" 18.16 1.37             Physical Examination  · Constitutional  o Appearance  o : Healthy-appearing, awake and alert in no acute distress  · Head and Face  o Head  o : Normocephalic with no worriesome skin lesions  · Eyes  o Vision  o :   § Visual Fields  § : eyes move symmetrical in all directions  o Sclerae  o : sclerae anicteric  o Pupils and Irises  o : pupils equal and symmetrical  · Neck  o Inspection/Palpation  o : Trachea is midline, no adenopathy  · Respiratory  o Respiratory Effort  o : Breathing is unlabored.  o Inspection of Chest  o : normal appearance  o Auscultation of Lungs  o : Chest is clear to auscultation bilaterally.  · Cardiovascular  o Heart  o :   § Auscultation of Heart  § : no murmurs, rubs, or gallops  o Peripheral Vascular System  o :   § Extremities  § : no cyanosis, clubbing or edema;   · Gastrointestinal  o Abdominal Examination  o : Abdomen is soft, nontender to palpation, with normal active bowel sounds, no appreciable hepatosplenomegaly.  o Digital Rectal Exam  o : deferred  · Skin and Subcutaneous Tissue  o General Inspection  o : without focal lesions; turgor is normal  · Psychiatric  o General  o : Alert and oriented x3  o Mood and Affect  o : mood normal, " flattened   · Extremities  o Extremities  o : no edema, no cyanosis, extremities are in constant motion c/w tardive dyskenesia          Assessment  · Gastroesophageal Reflux     530.81/K21.9  · Irritable bowel syndrome with constipation     564.1/K58.1      Plan  · Medications  o dicyclomine 20 mg oral tablet   SIG: take 1 tablet (20 mg) by oral route 3 times per day for 90 days   DISP: (270) Tablet with 1 refills  Adjusted on 03/22/2021     o omeprazole 40 mg oral capsule,delayed release(DR/EC)   SIG: take 1 capsule (40 mg) by oral route once daily before breakfast for 30 days   DISP: (90) Capsule with 1 refills  Adjusted on 03/22/2021     o Linzess 290 mcg oral capsule   SIG: take 1 capsule (290 mcg) by oral route once daily on an empty stomach at least 30 minutes before 1st meal of the day   DISP: (90) Capsule with 1 refills  Refilled on 03/22/2021     o Medications have been Reconciled  · Instructions  o Information given on current diagnoses.  · Disposition  o Follow up PRN - Call if any change in bowel pattern, abdominal pain, rectal bleeding, or any new GI complaint.            Electronically Signed by: AYLIN Nathan -Author on March 22, 2021 09:47:54 AM

## 2021-05-15 VITALS
WEIGHT: 100.37 LBS | TEMPERATURE: 98.2 F | BODY MASS INDEX: 18.95 KG/M2 | DIASTOLIC BLOOD PRESSURE: 41 MMHG | HEIGHT: 61 IN | SYSTOLIC BLOOD PRESSURE: 119 MMHG

## 2021-05-15 VITALS
BODY MASS INDEX: 22.66 KG/M2 | HEIGHT: 61 IN | SYSTOLIC BLOOD PRESSURE: 130 MMHG | DIASTOLIC BLOOD PRESSURE: 90 MMHG | WEIGHT: 120 LBS

## 2021-05-15 VITALS
SYSTOLIC BLOOD PRESSURE: 150 MMHG | DIASTOLIC BLOOD PRESSURE: 91 MMHG | HEIGHT: 61 IN | BODY MASS INDEX: 21.95 KG/M2 | HEART RATE: 124 BPM | WEIGHT: 116.25 LBS

## 2021-05-15 VITALS
BODY MASS INDEX: 20.01 KG/M2 | DIASTOLIC BLOOD PRESSURE: 84 MMHG | WEIGHT: 106 LBS | TEMPERATURE: 97.5 F | HEART RATE: 93 BPM | SYSTOLIC BLOOD PRESSURE: 131 MMHG | HEIGHT: 61 IN

## 2021-05-15 VITALS
WEIGHT: 103.5 LBS | DIASTOLIC BLOOD PRESSURE: 44 MMHG | BODY MASS INDEX: 19.54 KG/M2 | SYSTOLIC BLOOD PRESSURE: 96 MMHG | HEIGHT: 61 IN | TEMPERATURE: 98.2 F

## 2021-05-15 VITALS — WEIGHT: 100 LBS | BODY MASS INDEX: 18.88 KG/M2 | HEIGHT: 61 IN

## 2021-05-16 VITALS
HEIGHT: 61 IN | DIASTOLIC BLOOD PRESSURE: 68 MMHG | SYSTOLIC BLOOD PRESSURE: 139 MMHG | WEIGHT: 126 LBS | BODY MASS INDEX: 23.79 KG/M2

## 2021-05-16 VITALS
HEIGHT: 61 IN | BODY MASS INDEX: 21.59 KG/M2 | WEIGHT: 114.37 LBS | DIASTOLIC BLOOD PRESSURE: 79 MMHG | SYSTOLIC BLOOD PRESSURE: 112 MMHG

## 2021-05-16 VITALS — HEART RATE: 129 BPM | HEIGHT: 61 IN | WEIGHT: 101.37 LBS | BODY MASS INDEX: 19.14 KG/M2 | OXYGEN SATURATION: 99 %

## 2021-05-16 VITALS
SYSTOLIC BLOOD PRESSURE: 124 MMHG | SYSTOLIC BLOOD PRESSURE: 124 MMHG | WEIGHT: 108.5 LBS | HEIGHT: 61 IN | BODY MASS INDEX: 20.48 KG/M2 | DIASTOLIC BLOOD PRESSURE: 67 MMHG | DIASTOLIC BLOOD PRESSURE: 106 MMHG

## 2021-05-16 VITALS — BODY MASS INDEX: 24.17 KG/M2 | HEIGHT: 61 IN | RESPIRATION RATE: 16 BRPM | WEIGHT: 128 LBS

## 2021-05-16 VITALS
DIASTOLIC BLOOD PRESSURE: 78 MMHG | BODY MASS INDEX: 20.62 KG/M2 | HEIGHT: 61 IN | WEIGHT: 109.25 LBS | SYSTOLIC BLOOD PRESSURE: 111 MMHG

## 2021-05-16 VITALS — HEART RATE: 78 BPM | BODY MASS INDEX: 20.39 KG/M2 | HEIGHT: 61 IN | WEIGHT: 108 LBS | OXYGEN SATURATION: 96 %

## 2021-05-16 VITALS — BODY MASS INDEX: 20.77 KG/M2 | HEART RATE: 105 BPM | OXYGEN SATURATION: 94 % | WEIGHT: 110 LBS | HEIGHT: 61 IN

## 2021-05-16 VITALS
SYSTOLIC BLOOD PRESSURE: 112 MMHG | HEIGHT: 61 IN | BODY MASS INDEX: 25.11 KG/M2 | DIASTOLIC BLOOD PRESSURE: 71 MMHG | WEIGHT: 133 LBS

## 2021-05-16 VITALS
WEIGHT: 104.25 LBS | DIASTOLIC BLOOD PRESSURE: 81 MMHG | SYSTOLIC BLOOD PRESSURE: 121 MMHG | BODY MASS INDEX: 19.68 KG/M2 | HEIGHT: 61 IN

## 2021-05-16 VITALS — RESPIRATION RATE: 16 BRPM | BODY MASS INDEX: 24.17 KG/M2 | WEIGHT: 128 LBS | HEIGHT: 61 IN
